# Patient Record
Sex: MALE | Race: WHITE | NOT HISPANIC OR LATINO | Employment: OTHER | URBAN - METROPOLITAN AREA
[De-identification: names, ages, dates, MRNs, and addresses within clinical notes are randomized per-mention and may not be internally consistent; named-entity substitution may affect disease eponyms.]

---

## 2018-07-23 ENCOUNTER — HISTORICAL (OUTPATIENT)
Dept: ADMINISTRATIVE | Facility: HOSPITAL | Age: 43
End: 2018-07-23

## 2018-07-25 LAB — FINAL CULTURE: NORMAL

## 2018-08-02 ENCOUNTER — HISTORICAL (OUTPATIENT)
Dept: ADMINISTRATIVE | Facility: HOSPITAL | Age: 43
End: 2018-08-02

## 2018-08-02 LAB
BUN SERPL-MCNC: 19 MG/DL (ref 7–18)
CREAT SERPL-MCNC: 1.28 MG/DL (ref 0.7–1.3)

## 2020-06-26 ENCOUNTER — HISTORICAL (OUTPATIENT)
Dept: ADMINISTRATIVE | Facility: HOSPITAL | Age: 45
End: 2020-06-26

## 2020-06-28 LAB — FINAL CULTURE: NORMAL

## 2020-12-28 ENCOUNTER — HISTORICAL (OUTPATIENT)
Dept: ADMINISTRATIVE | Facility: HOSPITAL | Age: 45
End: 2020-12-28

## 2020-12-30 LAB — FINAL CULTURE: NORMAL

## 2022-05-11 RX ORDER — TESTOSTERONE CYPIONATE 200 MG/ML
1 INJECTION, SOLUTION INTRAMUSCULAR
COMMUNITY
Start: 2022-01-05 | End: 2022-05-23 | Stop reason: SDUPTHER

## 2022-05-11 RX ORDER — TESTOSTERONE CYPIONATE 200 MG/ML
200 INJECTION, SOLUTION INTRAMUSCULAR
Qty: 6 ML | Refills: 3 | Status: CANCELLED | OUTPATIENT
Start: 2022-05-11 | End: 2023-05-11

## 2022-05-11 NOTE — TELEPHONE ENCOUNTER
----- Message from Deidre Tan sent at 5/11/2022  1:20 PM CDT -----  Regarding: testosterone  Please call patient at [phone] regarding his testosterone

## 2022-05-23 ENCOUNTER — TELEPHONE (OUTPATIENT)
Dept: INTERNAL MEDICINE | Facility: CLINIC | Age: 47
End: 2022-05-23

## 2022-05-23 DIAGNOSIS — E29.1 HYPOGONADISM IN MALE: Primary | ICD-10-CM

## 2022-05-23 RX ORDER — TESTOSTERONE CYPIONATE 200 MG/ML
200 INJECTION, SOLUTION INTRAMUSCULAR
Qty: 6 ML | Refills: 1 | Status: SHIPPED | OUTPATIENT
Start: 2022-05-23 | End: 2022-12-05

## 2022-05-23 NOTE — TELEPHONE ENCOUNTER
----- Message from Jazmyne Sutton sent at 5/23/2022  9:33 AM CDT -----  Regarding: refilled?  Pt states that he was suppose to get a script called in for testostrone at [facility]  [phone]      Pharmacy told patient that nothing was called

## 2022-08-10 DIAGNOSIS — R33.9 URINARY RETENTION: Primary | ICD-10-CM

## 2022-08-10 RX ORDER — TAMSULOSIN HYDROCHLORIDE 0.4 MG/1
1 CAPSULE ORAL DAILY
COMMUNITY
End: 2022-08-10 | Stop reason: SDUPTHER

## 2022-08-10 RX ORDER — TAMSULOSIN HYDROCHLORIDE 0.4 MG/1
1 CAPSULE ORAL DAILY
Qty: 90 CAPSULE | Refills: 1 | Status: SHIPPED | OUTPATIENT
Start: 2022-08-10 | End: 2022-10-03 | Stop reason: SDUPTHER

## 2022-10-03 DIAGNOSIS — R33.9 URINARY RETENTION: ICD-10-CM

## 2022-10-03 RX ORDER — TAMSULOSIN HYDROCHLORIDE 0.4 MG/1
1 CAPSULE ORAL 2 TIMES DAILY
Qty: 90 CAPSULE | Refills: 1 | Status: SHIPPED | OUTPATIENT
Start: 2022-10-03 | End: 2023-01-03 | Stop reason: SDUPTHER

## 2022-10-03 RX ORDER — TAMSULOSIN HYDROCHLORIDE 0.4 MG/1
1 CAPSULE ORAL DAILY
Qty: 90 CAPSULE | Refills: 1 | Status: SHIPPED | OUTPATIENT
Start: 2022-10-03 | End: 2022-10-03 | Stop reason: SDUPTHER

## 2022-12-03 DIAGNOSIS — E29.1 HYPOGONADISM IN MALE: Primary | ICD-10-CM

## 2022-12-05 RX ORDER — TESTOSTERONE CYPIONATE 200 MG/ML
INJECTION, SOLUTION INTRAMUSCULAR
Qty: 10 ML | Refills: 1 | Status: SHIPPED | OUTPATIENT
Start: 2022-12-05 | End: 2023-09-13

## 2023-01-03 DIAGNOSIS — R33.9 URINARY RETENTION: ICD-10-CM

## 2023-01-03 RX ORDER — TAMSULOSIN HYDROCHLORIDE 0.4 MG/1
0.4 CAPSULE ORAL 2 TIMES DAILY
Qty: 180 CAPSULE | Refills: 2 | Status: SHIPPED | OUTPATIENT
Start: 2023-01-03 | End: 2023-10-18 | Stop reason: SDUPTHER

## 2023-03-28 DIAGNOSIS — Z00.00 WELLNESS EXAMINATION: ICD-10-CM

## 2023-03-28 DIAGNOSIS — Z12.5 SCREENING FOR PROSTATE CANCER: ICD-10-CM

## 2023-03-28 DIAGNOSIS — I10 HYPERTENSION, UNSPECIFIED TYPE: ICD-10-CM

## 2023-03-28 DIAGNOSIS — E29.1 HYPOGONADISM IN MALE: ICD-10-CM

## 2023-03-28 DIAGNOSIS — R33.9 URINARY RETENTION: Primary | ICD-10-CM

## 2023-03-28 DIAGNOSIS — E78.5 HYPERLIPIDEMIA, UNSPECIFIED HYPERLIPIDEMIA TYPE: ICD-10-CM

## 2023-03-30 ENCOUNTER — TELEPHONE (OUTPATIENT)
Dept: INTERNAL MEDICINE | Facility: CLINIC | Age: 48
End: 2023-03-30
Payer: COMMERCIAL

## 2023-03-30 LAB
CHOLEST SERPL-MSCNC: 173 MG/DL (ref 0–200)
HDLC SERPL-MCNC: 39 MG/DL (ref 35–70)
LDLC SERPL CALC-MCNC: 113 MG/DL (ref 0–160)
TRIGL SERPL-MCNC: 106 MG/DL (ref 40–160)

## 2023-04-03 PROBLEM — R33.9 URINARY RETENTION: Status: ACTIVE | Noted: 2023-04-03

## 2023-04-04 ENCOUNTER — OFFICE VISIT (OUTPATIENT)
Dept: INTERNAL MEDICINE | Facility: CLINIC | Age: 48
End: 2023-04-04
Payer: COMMERCIAL

## 2023-04-04 VITALS
HEART RATE: 68 BPM | SYSTOLIC BLOOD PRESSURE: 126 MMHG | OXYGEN SATURATION: 99 % | WEIGHT: 265.81 LBS | DIASTOLIC BLOOD PRESSURE: 74 MMHG | BODY MASS INDEX: 36 KG/M2 | HEIGHT: 72 IN

## 2023-04-04 DIAGNOSIS — Z00.00 WELLNESS EXAMINATION: Primary | ICD-10-CM

## 2023-04-04 DIAGNOSIS — N40.0 PROSTATE HYPERTROPHY: ICD-10-CM

## 2023-04-04 DIAGNOSIS — Z12.5 SCREENING FOR PROSTATE CANCER: ICD-10-CM

## 2023-04-04 DIAGNOSIS — Z12.11 SCREENING FOR MALIGNANT NEOPLASM OF COLON: ICD-10-CM

## 2023-04-04 DIAGNOSIS — E78.5 HYPERLIPIDEMIA, UNSPECIFIED HYPERLIPIDEMIA TYPE: ICD-10-CM

## 2023-04-04 DIAGNOSIS — R33.9 URINARY RETENTION: ICD-10-CM

## 2023-04-04 PROCEDURE — 3078F DIAST BP <80 MM HG: CPT | Mod: CPTII,,, | Performed by: INTERNAL MEDICINE

## 2023-04-04 PROCEDURE — 3008F BODY MASS INDEX DOCD: CPT | Mod: CPTII,,, | Performed by: INTERNAL MEDICINE

## 2023-04-04 PROCEDURE — 3078F PR MOST RECENT DIASTOLIC BLOOD PRESSURE < 80 MM HG: ICD-10-PCS | Mod: CPTII,,, | Performed by: INTERNAL MEDICINE

## 2023-04-04 PROCEDURE — 4010F ACE/ARB THERAPY RXD/TAKEN: CPT | Mod: CPTII,,, | Performed by: INTERNAL MEDICINE

## 2023-04-04 PROCEDURE — 3008F PR BODY MASS INDEX (BMI) DOCUMENTED: ICD-10-PCS | Mod: CPTII,,, | Performed by: INTERNAL MEDICINE

## 2023-04-04 PROCEDURE — 4010F PR ACE/ARB THEARPY RXD/TAKEN: ICD-10-PCS | Mod: CPTII,,, | Performed by: INTERNAL MEDICINE

## 2023-04-04 PROCEDURE — 1160F PR REVIEW ALL MEDS BY PRESCRIBER/CLIN PHARMACIST DOCUMENTED: ICD-10-PCS | Mod: CPTII,,, | Performed by: INTERNAL MEDICINE

## 2023-04-04 PROCEDURE — 99396 PREV VISIT EST AGE 40-64: CPT | Mod: ,,, | Performed by: INTERNAL MEDICINE

## 2023-04-04 PROCEDURE — 1159F PR MEDICATION LIST DOCUMENTED IN MEDICAL RECORD: ICD-10-PCS | Mod: CPTII,,, | Performed by: INTERNAL MEDICINE

## 2023-04-04 PROCEDURE — 99396 PR PREVENTIVE VISIT,EST,40-64: ICD-10-PCS | Mod: ,,, | Performed by: INTERNAL MEDICINE

## 2023-04-04 PROCEDURE — 1160F RVW MEDS BY RX/DR IN RCRD: CPT | Mod: CPTII,,, | Performed by: INTERNAL MEDICINE

## 2023-04-04 PROCEDURE — 3074F SYST BP LT 130 MM HG: CPT | Mod: CPTII,,, | Performed by: INTERNAL MEDICINE

## 2023-04-04 PROCEDURE — 1159F MED LIST DOCD IN RCRD: CPT | Mod: CPTII,,, | Performed by: INTERNAL MEDICINE

## 2023-04-04 PROCEDURE — 3074F PR MOST RECENT SYSTOLIC BLOOD PRESSURE < 130 MM HG: ICD-10-PCS | Mod: CPTII,,, | Performed by: INTERNAL MEDICINE

## 2023-04-04 RX ORDER — NEEDLES, DISPOSABLE 25GX5/8"
NEEDLE, DISPOSABLE MISCELLANEOUS
COMMUNITY
Start: 2022-12-05 | End: 2024-02-08

## 2023-04-04 RX ORDER — LISINOPRIL AND HYDROCHLOROTHIAZIDE 10; 12.5 MG/1; MG/1
1 TABLET ORAL
COMMUNITY
Start: 2023-01-03 | End: 2023-04-06 | Stop reason: SDUPTHER

## 2023-04-04 RX ORDER — SERTRALINE HYDROCHLORIDE 100 MG/1
100 TABLET, FILM COATED ORAL
COMMUNITY
Start: 2023-03-18

## 2023-04-04 RX ORDER — SYRINGE WITH NEEDLE, 1 ML 25GX5/8"
SYRINGE, EMPTY DISPOSABLE MISCELLANEOUS
COMMUNITY
Start: 2022-10-10

## 2023-04-04 RX ORDER — ALLOPURINOL 100 MG/1
100 TABLET ORAL
COMMUNITY
Start: 2023-01-13 | End: 2023-04-13 | Stop reason: SDUPTHER

## 2023-04-04 NOTE — PROGRESS NOTES
Levi Chaney MD        PATIENT NAME: Yevgeniy Yin  : 1975  DATE: 23  MRN: [id number]      Billing Provider: Levi Chaney MD  Level of Service:   Patient PCP Information       Provider PCP Type    Levi Chaney MD General            Reason for Visit / Chief Complaint: Annual Exam (Wellness/)       Update PCP  Update Chief Complaint         History of Present Illness / Problem Focused Workflow     Yevgeniy Yin presents to the clinic with Annual Exam (Wellness/)     Yevgeniy is here for his annual wellness exam   He is doing well with no problems   Testosterone injections going well.  He does have an issue that the Flomax is working as well as it should and having trouble with his urine      Review of Systems   Review of Systems   Constitutional: Negative.    HENT: Negative.     Eyes: Negative.    Respiratory: Negative.     Cardiovascular: Negative.    Gastrointestinal: Negative.    Endocrine: Negative.    Genitourinary: Negative.    Musculoskeletal: Negative.    Integumentary:  Negative.   Neurological: Negative.    Psychiatric/Behavioral: Negative.        Medical / Social / Family History     Past Medical History:   Diagnosis Date    Gout, unspecified     High blood pressure     Hypogonadism in male     Sleep apnea, unspecified        Past Surgical History:   Procedure Laterality Date    APPENDECTOMY      FACIAL RECONSTRUCTION SURGERY N/A     KNEE SURGERY N/A     x2       Social History  Mr. Yin  reports that he has never smoked. He has never been exposed to tobacco smoke. He has never used smokeless tobacco. He reports current alcohol use of about 1.0 standard drink per week.    Family History  Mr.'s Yin  family history is not on file.    Medications and Allergies     Medications  Outpatient Medications Marked as Taking for the 23 encounter (Office Visit) with Levi Chaney MD   Medication Sig Dispense Refill    allopurinoL (ZYLOPRIM) 100 MG tablet Take 100 mg  "by mouth.      BD LUER-TENISHA SYRINGE 3 mL 22 x 1 1/2" Syrg SMARTSIG:Injection As Directed      BD REGULAR BEVEL NEEDLES 18 gauge x 1" Ndle SMARTSIG:Injection As Directed      lisinopriL-hydrochlorothiazide (PRINZIDE,ZESTORETIC) 10-12.5 mg per tablet Take 1 tablet by mouth.      sertraline (ZOLOFT) 100 MG tablet Take 100 mg by mouth.      tamsulosin (FLOMAX) 0.4 mg Cap Take 1 capsule (0.4 mg total) by mouth 2 (two) times a day. 180 capsule 2    testosterone cypionate (DEPOTESTOTERONE CYPIONATE) 200 mg/mL injection INJECT ONE MILLILITER intramuscularly EVERY 2 WEEKS 10 mL 1       Allergies  Review of patient's allergies indicates:  No Known Allergies    Physical Examination     Vitals:    04/04/23 0841   BP: 126/74   Pulse: 68     Physical Exam  Vitals reviewed.   Constitutional:       Appearance: Normal appearance.   HENT:      Head: Normocephalic.      Right Ear: Tympanic membrane normal.      Left Ear: Tympanic membrane normal.      Nose: Nose normal.      Mouth/Throat:      Pharynx: Oropharynx is clear.   Eyes:      Extraocular Movements: Extraocular movements intact.      Pupils: Pupils are equal, round, and reactive to light.   Cardiovascular:      Rate and Rhythm: Normal rate and regular rhythm.      Pulses: Normal pulses.      Heart sounds: Normal heart sounds.   Pulmonary:      Effort: Pulmonary effort is normal.      Breath sounds: Normal breath sounds.   Abdominal:      General: Abdomen is flat. Bowel sounds are normal.      Palpations: Abdomen is soft.   Genitourinary:     Testes: Normal.      Prostate: Normal.      Rectum: Normal.   Musculoskeletal:         General: Normal range of motion.      Cervical back: Normal range of motion.   Skin:     General: Skin is warm and dry.   Neurological:      General: No focal deficit present.      Mental Status: He is alert and oriented to person, place, and time.   Psychiatric:         Mood and Affect: Mood normal.         Behavior: Behavior normal.        Assessment " and Plan (including Health Maintenance)      Problem List  Smart Sets  Document Outside HM   :    Plan:   Wellness examination    Hyperlipidemia, unspecified hyperlipidemia type    Screening for prostate cancer    Prostate hypertrophy    Screening for malignant neoplasm of colon    Urinary retention     Review of all labs stable   Referral to urology for further evaluation   Referral for colonoscopy   Revisit 1 year annual wellness  He will continue this testosterone injections every 2 weeks.           Health Maintenance Due   Topic Date Due    Hepatitis C Screening  Never done    HIV Screening  Never done    Hemoglobin A1c (Diabetic Prevention Screening)  Never done    Colorectal Cancer Screening  Never done    COVID-19 Vaccine (3 - Booster for Pfizer series) 06/07/2021    Influenza Vaccine (1) Never done       Problem List Items Addressed This Visit    None  Visit Diagnoses       Wellness examination    -  Primary    Hyperlipidemia, unspecified hyperlipidemia type        Screening for prostate cancer        Prostate hypertrophy        Screening for malignant neoplasm of colon        Urinary retention                Health Maintenance Topics with due status: Not Due       Topic Last Completion Date    TETANUS VACCINE 05/23/2022    Lipid Panel 03/30/2023       No future appointments.         Signature:  Levi Arreola MD  [facility]  [address]  [place] [id number]    Date of encounter: 4/4/23

## 2023-04-06 DIAGNOSIS — I10 HYPERTENSION, UNSPECIFIED TYPE: Primary | Chronic | ICD-10-CM

## 2023-04-06 RX ORDER — LISINOPRIL AND HYDROCHLOROTHIAZIDE 10; 12.5 MG/1; MG/1
1 TABLET ORAL DAILY
Qty: 30 TABLET | Refills: 11 | Status: SHIPPED | OUTPATIENT
Start: 2023-04-06

## 2023-04-13 DIAGNOSIS — M10.9 GOUT, UNSPECIFIED CAUSE, UNSPECIFIED CHRONICITY, UNSPECIFIED SITE: Primary | Chronic | ICD-10-CM

## 2023-04-13 RX ORDER — ALLOPURINOL 100 MG/1
100 TABLET ORAL DAILY
Qty: 30 TABLET | Refills: 3 | Status: SHIPPED | OUTPATIENT
Start: 2023-04-13 | End: 2023-08-22 | Stop reason: SDUPTHER

## 2023-06-14 ENCOUNTER — TELEPHONE (OUTPATIENT)
Dept: INTERNAL MEDICINE | Facility: CLINIC | Age: 48
End: 2023-06-14
Payer: COMMERCIAL

## 2023-06-14 NOTE — TELEPHONE ENCOUNTER
----- Message from Jaimie Castellano sent at 6/14/2023  9:34 AM CDT -----  .Type:  Needs Medical Advice    Who Called: pt  Symptoms (please be specific):    How long has patient had these symptoms:    Pharmacy name and phone #:    Would the patient rather a call back or a response via [facility]?   Best Call Back Number: [phone]  Additional Information: please resend urology referral they told pt they didn't receive it please fax to [phone]

## 2023-06-14 NOTE — TELEPHONE ENCOUNTER
Spoke with Isabel at [facility]. They received referral and they are working on it. They will be calling him. Patient was notified

## 2023-07-06 LAB — CRC RECOMMENDATION EXT: NORMAL

## 2023-07-13 ENCOUNTER — PATIENT OUTREACH (OUTPATIENT)
Dept: ADMINISTRATIVE | Facility: HOSPITAL | Age: 48
End: 2023-07-13
Payer: COMMERCIAL

## 2023-08-22 ENCOUNTER — TELEPHONE (OUTPATIENT)
Dept: INTERNAL MEDICINE | Facility: CLINIC | Age: 48
End: 2023-08-22
Payer: COMMERCIAL

## 2023-08-22 DIAGNOSIS — M10.9 GOUT, UNSPECIFIED CAUSE, UNSPECIFIED CHRONICITY, UNSPECIFIED SITE: Primary | Chronic | ICD-10-CM

## 2023-08-22 RX ORDER — ALLOPURINOL 100 MG/1
100 TABLET ORAL DAILY
Qty: 30 TABLET | Refills: 3 | Status: SHIPPED | OUTPATIENT
Start: 2023-08-22 | End: 2024-01-22 | Stop reason: SDUPTHER

## 2023-08-22 NOTE — TELEPHONE ENCOUNTER
----- Message from Nae Womack sent at 8/22/2023  3:28 PM CDT -----  .Type:  RX Refill Request    Who Called: pt  Refill or New Rx:refill  RX Name and Strength:allopurinoL (ZYLOPRIM) 100 MG tablet  How is the patient currently taking it? 1XDay  Is this a 30 day or 90 day RX:30  Preferred Pharmacy with phone number:[address]  Local or Mail Order:local  Ordering Provider:Elba  Would the patient rather a call back or a response via [facility]? Call back   Best Call Back Number:[phone]  Additional Information: requesting refill

## 2023-09-12 DIAGNOSIS — E29.1 HYPOGONADISM IN MALE: ICD-10-CM

## 2023-09-13 RX ORDER — TESTOSTERONE CYPIONATE 200 MG/ML
INJECTION, SOLUTION INTRAMUSCULAR
Qty: 10 ML | Refills: 1 | Status: SHIPPED | OUTPATIENT
Start: 2023-09-13

## 2023-10-18 DIAGNOSIS — R33.9 URINARY RETENTION: ICD-10-CM

## 2023-10-18 RX ORDER — TAMSULOSIN HYDROCHLORIDE 0.4 MG/1
0.4 CAPSULE ORAL 2 TIMES DAILY
Qty: 180 CAPSULE | Refills: 3 | Status: SHIPPED | OUTPATIENT
Start: 2023-10-18

## 2024-01-22 DIAGNOSIS — M10.9 GOUT, UNSPECIFIED CAUSE, UNSPECIFIED CHRONICITY, UNSPECIFIED SITE: Chronic | ICD-10-CM

## 2024-01-22 RX ORDER — ALLOPURINOL 100 MG/1
100 TABLET ORAL DAILY
Qty: 30 TABLET | Refills: 3 | Status: SHIPPED | OUTPATIENT
Start: 2024-01-22 | End: 2024-05-30 | Stop reason: SDUPTHER

## 2024-02-08 RX ORDER — NEEDLES, DISPOSABLE 25GX5/8"
NEEDLE, DISPOSABLE MISCELLANEOUS
Qty: 10 EACH | Refills: 2 | Status: SHIPPED | OUTPATIENT
Start: 2024-02-08

## 2024-02-08 RX ORDER — SYRINGE WITH NEEDLE, 1 ML 25GX5/8"
SYRINGE, EMPTY DISPOSABLE MISCELLANEOUS
Qty: 10 EACH | Refills: 2 | Status: SHIPPED | OUTPATIENT
Start: 2024-02-08

## 2024-05-30 DIAGNOSIS — M10.9 GOUT, UNSPECIFIED CAUSE, UNSPECIFIED CHRONICITY, UNSPECIFIED SITE: Chronic | ICD-10-CM

## 2024-05-30 RX ORDER — ALLOPURINOL 100 MG/1
100 TABLET ORAL DAILY
Qty: 30 TABLET | Refills: 3 | Status: SHIPPED | OUTPATIENT
Start: 2024-05-30

## 2024-06-16 DIAGNOSIS — E29.1 HYPOGONADISM IN MALE: ICD-10-CM

## 2024-06-17 RX ORDER — TESTOSTERONE CYPIONATE 200 MG/ML
INJECTION, SOLUTION INTRAMUSCULAR
Qty: 10 ML | Refills: 1 | Status: SHIPPED | OUTPATIENT
Start: 2024-06-17

## 2024-06-24 ENCOUNTER — TELEPHONE (OUTPATIENT)
Dept: INTERNAL MEDICINE | Facility: CLINIC | Age: 49
End: 2024-06-24
Payer: COMMERCIAL

## 2024-06-24 NOTE — TELEPHONE ENCOUNTER
----- Message from Ector Yin sent at 6/24/2024  3:35 PM CDT -----  .Type:  Needs Medical Advice    Who Called: Yevgeniy  Symptoms (please be specific):    How long has patient had these symptoms:    Pharmacy name and phone #:    Would the patient rather a call back or a response via [facility]?   Best Call Back Number: [phone]  Additional Information: Made an apt reschedule wellness for next Monday the 1st. He requested lab orders be mailed to him at home address so he can go do blood work at [facility] on [address].

## 2024-06-27 PROBLEM — Z00.00 WELLNESS EXAMINATION: Status: ACTIVE | Noted: 2024-06-27

## 2024-06-28 ENCOUNTER — PATIENT OUTREACH (OUTPATIENT)
Facility: CLINIC | Age: 49
End: 2024-06-28
Payer: COMMERCIAL

## 2024-06-28 LAB
CHOLEST SERPL-MSCNC: 187 MG/DL (ref 0–200)
HDLC SERPL-MCNC: 45 MG/DL (ref 35–70)
LDLC SERPL CALC-MCNC: 124 MG/DL (ref 0–160)
TRIGL SERPL-MCNC: 93 MG/DL (ref 40–160)

## 2024-06-28 NOTE — PROGRESS NOTES
Health Maintenance Topic(s) Outreach Outcomes & Actions Taken:    Lab(s) - Outreach Outcomes & Actions Taken  : External Records Uploaded & Care Team Updated if Applicable     Additional Notes:  Lipid 6/28/24

## 2024-07-01 ENCOUNTER — OFFICE VISIT (OUTPATIENT)
Dept: INTERNAL MEDICINE | Facility: CLINIC | Age: 49
End: 2024-07-01
Payer: COMMERCIAL

## 2024-07-01 ENCOUNTER — TELEPHONE (OUTPATIENT)
Dept: INTERNAL MEDICINE | Facility: CLINIC | Age: 49
End: 2024-07-01

## 2024-07-01 VITALS
BODY MASS INDEX: 34.4 KG/M2 | DIASTOLIC BLOOD PRESSURE: 80 MMHG | OXYGEN SATURATION: 95 % | HEIGHT: 72 IN | HEART RATE: 70 BPM | RESPIRATION RATE: 18 BRPM | WEIGHT: 254 LBS | SYSTOLIC BLOOD PRESSURE: 124 MMHG

## 2024-07-01 DIAGNOSIS — E29.1 HYPOGONADISM IN MALE: Chronic | ICD-10-CM

## 2024-07-01 DIAGNOSIS — Z00.00 WELLNESS EXAMINATION: Primary | ICD-10-CM

## 2024-07-01 DIAGNOSIS — M10.00 IDIOPATHIC GOUT, UNSPECIFIED CHRONICITY, UNSPECIFIED SITE: Chronic | ICD-10-CM

## 2024-07-01 DIAGNOSIS — I10 PRIMARY HYPERTENSION: Chronic | ICD-10-CM

## 2024-07-01 PROCEDURE — 3074F SYST BP LT 130 MM HG: CPT | Mod: CPTII,,, | Performed by: INTERNAL MEDICINE

## 2024-07-01 PROCEDURE — 1160F RVW MEDS BY RX/DR IN RCRD: CPT | Mod: CPTII,,, | Performed by: INTERNAL MEDICINE

## 2024-07-01 PROCEDURE — 3008F BODY MASS INDEX DOCD: CPT | Mod: CPTII,,, | Performed by: INTERNAL MEDICINE

## 2024-07-01 PROCEDURE — 3079F DIAST BP 80-89 MM HG: CPT | Mod: CPTII,,, | Performed by: INTERNAL MEDICINE

## 2024-07-01 PROCEDURE — 99396 PREV VISIT EST AGE 40-64: CPT | Mod: ,,, | Performed by: INTERNAL MEDICINE

## 2024-07-01 PROCEDURE — 1159F MED LIST DOCD IN RCRD: CPT | Mod: CPTII,,, | Performed by: INTERNAL MEDICINE

## 2024-07-01 RX ORDER — FINASTERIDE 5 MG/1
1 TABLET, FILM COATED ORAL DAILY
COMMUNITY

## 2024-07-01 RX ORDER — TESTOSTERONE CYPIONATE 200 MG/ML
300 INJECTION, SOLUTION INTRAMUSCULAR
Qty: 10 ML | Refills: 5 | Status: SHIPPED | OUTPATIENT
Start: 2024-07-01 | End: 2026-01-12

## 2024-07-01 NOTE — TELEPHONE ENCOUNTER
----- Message from Deidre Tan sent at 7/1/2024  9:39 AM CDT -----  Regarding: testosterone  Patient wants to make sure you changed testosterone to 1.5

## 2024-07-01 NOTE — PROGRESS NOTES
Levi Chaney MD        PATIENT NAME: Yevgeniy Yin  : 1975  DATE: 24  MRN: [id number]      Billing Provider: Levi Chaney MD  Level of Service: [place] PREVENTIVE VISIT,EST,40-64  Patient PCP Information       Provider PCP Type    Levi Chaney MD General            Reason for Visit / Chief Complaint: Annual Exam (Discuss labs /)       Update PCP  Update Chief Complaint         History of Present Illness / Problem Focused Workflow     Yevgeniy Yin presents to the clinic with Annual Exam (Discuss labs /)     Yevgeniy is here for his annual wellness exam   His only issue swelling of his right foot which is cardiologist's is evaluating.        Review of Systems   Review of Systems   Constitutional: Negative.    HENT: Negative.     Eyes: Negative.    Respiratory: Negative.     Cardiovascular: Negative.    Gastrointestinal: Negative.    Endocrine: Negative.    Genitourinary: Negative.    Musculoskeletal: Negative.    Integumentary:  Negative.   Neurological: Negative.    Psychiatric/Behavioral: Negative.          Medical / Social / Family History     Past Medical History:   Diagnosis Date    Gout, unspecified     Hypogonadism in male     Sleep apnea, unspecified        Past Surgical History:   Procedure Laterality Date    APPENDECTOMY      Colon AEC   2023    Trever Alexis/Repeat colonoscopy in 10 years    FACIAL RECONSTRUCTION SURGERY N/A     KNEE SURGERY N/A     x2       Social History  Mr. Yin  reports that he has never smoked. He has never been exposed to tobacco smoke. He has never used smokeless tobacco. He reports current alcohol use of about 1.0 standard drink of alcohol per week.    Family History  Mr.'s Yin  family history is not on file.    Medications and Allergies     Medications  Outpatient Medications Marked as Taking for the 24 encounter (Office Visit) with Levi Chaney MD   Medication Sig Dispense Refill    allopurinoL (ZYLOPRIM) 100 MG  "tablet Take 1 tablet (100 mg total) by mouth once daily. 30 tablet 3    BD LUER-TENISHA SYRINGE 3 mL 22 gauge x 1" Syrg USE AS DIRECTED TO INJECT TESTOSTERONE. 10 each 2    BD LUER-TENISHA SYRINGE 3 mL 22 x 1 1/2" Syrg SMARTSIG:Injection As Directed      BD REGULAR BEVEL NEEDLES 18 gauge x 1" Ndle USE AS DIRECTED TO INJECT TESTOSTERONE. 10 each 2    finasteride (PROSCAR) 5 mg tablet Take 1 tablet by mouth once daily.      sertraline (ZOLOFT) 100 MG tablet Take 100 mg by mouth.      tamsulosin (FLOMAX) 0.4 mg Cap Take 1 capsule (0.4 mg total) by mouth 2 (two) times a day. 180 capsule 3    testosterone cypionate (DEPOTESTOTERONE CYPIONATE) 200 mg/mL injection INJECT 1 ML INTRAMUSCULARLY ONCE EVERY 2 WEEKS 10 mL 1       Allergies  Review of patient's allergies indicates:   Allergen Reactions    Tramadol Hallucinations       Physical Examination     Vitals:    07/01/24 0918   BP: 124/80   Pulse: 70   Resp: 18     Physical Exam  Vitals reviewed.   Constitutional:       Appearance: Normal appearance.   HENT:      Head: Normocephalic.      Right Ear: Tympanic membrane normal.      Left Ear: Tympanic membrane normal.      Nose: Nose normal.      Mouth/Throat:      Pharynx: Oropharynx is clear.   Eyes:      Extraocular Movements: Extraocular movements intact.      Pupils: Pupils are equal, round, and reactive to light.   Cardiovascular:      Rate and Rhythm: Normal rate and regular rhythm.      Pulses: Normal pulses.      Heart sounds: Normal heart sounds.   Pulmonary:      Effort: Pulmonary effort is normal.      Breath sounds: Normal breath sounds.   Abdominal:      General: Abdomen is flat. Bowel sounds are normal.      Palpations: Abdomen is soft.   Genitourinary:     Testes: Normal.      Prostate: Normal.      Rectum: Normal.   Musculoskeletal:         General: Normal range of motion.      Cervical back: Normal range of motion.   Skin:     General: Skin is warm and dry.   Neurological:      General: No focal deficit present.    "   Mental Status: He is alert and oriented to person, place, and time.   Psychiatric:         Mood and Affect: Mood normal.         Behavior: Behavior normal.          Assessment and Plan (including Health Maintenance)      Problem List  Smart Sets  Document Outside HM   :    Plan:    ICD-10-CM ICD-9-CM   1. Wellness examination  Z00.00 V70.0   2. Idiopathic gout, unspecified chronicity, unspecified site  M10.00 274.9   3. Primary hypertension  I10 401.9   4. Hypogonadism in male  E29.1 257.2       Problem List Items Addressed This Visit          Endocrine    Hypogonadism in male (Chronic)     Testosterone and PSA level were not done by lab were checking into this   He says he is very symptomatic after about a week and low we would like to go back to the 1-1/2 cc every 2 weeks I have prescribed this for him  His H&H is slightly elevated I have encouraged him to donate a unit of blood every 3 months.            Orthopedic    Gout, unspecified (Chronic)     No gout attacks continue medication            Other    Wellness examination - Primary     Discussed results of examination laboratory.  He will return in six-month          Other Visit Diagnoses       Primary hypertension  (Chronic)                      Health Maintenance Due   Topic Date Due    Hepatitis C Screening  Never done    HIV Screening  Never done    Hemoglobin A1c (Diabetic Prevention Screening)  Never done    Shingles Vaccine (1 of 2) Never done    COVID-19 Vaccine (3 - 2023-24 season) 09/01/2023       Problem List Items Addressed This Visit          Endocrine    Hypogonadism in male (Chronic)    Current Assessment & Plan     Testosterone and PSA level were not done by lab were checking into this   He says he is very symptomatic after about a week and low we would like to go back to the 1-1/2 cc every 2 weeks I have prescribed this for him  His H&H is slightly elevated I have encouraged him to donate a unit of blood every 3 months.            Orthopedic     Gout, unspecified (Chronic)    Current Assessment & Plan     No gout attacks continue medication            Other    Wellness examination - Primary    Current Assessment & Plan     Discussed results of examination laboratory.  He will return in six-month          Other Visit Diagnoses       Primary hypertension  (Chronic)               Health Maintenance Topics with due status: Not Due       Topic Last Completion Date    TETANUS VACCINE 05/23/2022    Colorectal Cancer Screening 07/06/2023    Lipid Panel 06/28/2024    Influenza Vaccine Not Due       Future Appointments   Date Time Provider Department Center   1/21/2025  9:40 AM Levi Chaney MD [place] [address]            Signature:  Levi Chaney MD  [facility]  [facility] [facility] [address]    Date of encounter: 7/1/24

## 2024-07-01 NOTE — ASSESSMENT & PLAN NOTE
Testosterone and PSA level were not done by lab were checking into this   He says he is very symptomatic after about a week and low we would like to go back to the 1-1/2 cc every 2 weeks I have prescribed this for him  His H&H is slightly elevated I have encouraged him to donate a unit of blood every 3 months.

## 2024-07-01 NOTE — TELEPHONE ENCOUNTER
Yes new prescription is for 1.5 cc   Told to make sure that the pharmacist gets this correct when he picks up the medicine this when he picks up the medicine

## 2024-09-30 PROBLEM — Z00.00 WELLNESS EXAMINATION: Status: RESOLVED | Noted: 2024-06-27 | Resolved: 2024-09-30

## 2024-10-01 DIAGNOSIS — M10.9 GOUT, UNSPECIFIED CAUSE, UNSPECIFIED CHRONICITY, UNSPECIFIED SITE: Chronic | ICD-10-CM

## 2024-10-01 RX ORDER — ALLOPURINOL 100 MG/1
100 TABLET ORAL DAILY
Qty: 30 TABLET | Refills: 3 | Status: SHIPPED | OUTPATIENT
Start: 2024-10-01

## 2024-11-18 ENCOUNTER — TELEPHONE (OUTPATIENT)
Dept: INTERNAL MEDICINE | Facility: CLINIC | Age: 49
End: 2024-11-18
Payer: COMMERCIAL

## 2024-11-18 DIAGNOSIS — R33.9 URINARY RETENTION: ICD-10-CM

## 2024-11-18 RX ORDER — TAMSULOSIN HYDROCHLORIDE 0.4 MG/1
0.4 CAPSULE ORAL 2 TIMES DAILY
Qty: 180 CAPSULE | Refills: 3 | Status: SHIPPED | OUTPATIENT
Start: 2024-11-18

## 2024-11-18 NOTE — TELEPHONE ENCOUNTER
----- Message from Ector sent at 11/18/2024  7:47 AM CST -----  .Type:  Needs Medical Advice    Who Called: Yevgeniy   Symptoms (please be specific):    How long has patient had these symptoms:    Pharmacy name and phone #:    Would the patient rather a call back or a response via [facility]?   Best Call Back Number: [phone]  Additional Information: Patient requested to have the nurse call him back he needed to get a referral for a ankle sprain or possible break, please call him back when available.

## 2024-11-18 NOTE — TELEPHONE ENCOUNTER
Pt called for a referral to ortho but states that she found some paper work form a provider that dr houser referred him to before, he will call them for an appointment.

## 2025-01-07 ENCOUNTER — TELEPHONE (OUTPATIENT)
Dept: INTERNAL MEDICINE | Facility: CLINIC | Age: 50
End: 2025-01-07
Payer: COMMERCIAL

## 2025-01-14 ENCOUNTER — TELEPHONE (OUTPATIENT)
Dept: INTERNAL MEDICINE | Facility: CLINIC | Age: 50
End: 2025-01-14
Payer: COMMERCIAL

## 2025-01-14 DIAGNOSIS — Z13.228 SCREENING FOR ENDOCRINE, METABOLIC AND IMMUNITY DISORDER: ICD-10-CM

## 2025-01-14 DIAGNOSIS — R33.9 URINARY RETENTION: ICD-10-CM

## 2025-01-14 DIAGNOSIS — I10 PRIMARY HYPERTENSION: ICD-10-CM

## 2025-01-14 DIAGNOSIS — Z13.29 SCREENING FOR ENDOCRINE, METABOLIC AND IMMUNITY DISORDER: ICD-10-CM

## 2025-01-14 DIAGNOSIS — M10.9 GOUT, UNSPECIFIED CAUSE, UNSPECIFIED CHRONICITY, UNSPECIFIED SITE: Primary | Chronic | ICD-10-CM

## 2025-01-14 DIAGNOSIS — Z13.0 SCREENING FOR ENDOCRINE, METABOLIC AND IMMUNITY DISORDER: ICD-10-CM

## 2025-01-14 DIAGNOSIS — E55.9 VITAMIN D DEFICIENCY: ICD-10-CM

## 2025-01-14 DIAGNOSIS — E78.5 HYPERLIPIDEMIA, UNSPECIFIED HYPERLIPIDEMIA TYPE: ICD-10-CM

## 2025-01-16 ENCOUNTER — PATIENT OUTREACH (OUTPATIENT)
Facility: CLINIC | Age: 50
End: 2025-01-16
Payer: COMMERCIAL

## 2025-01-16 ENCOUNTER — TELEPHONE (OUTPATIENT)
Dept: INTERNAL MEDICINE | Facility: CLINIC | Age: 50
End: 2025-01-16
Payer: COMMERCIAL

## 2025-01-16 LAB
CHOLEST SERPL-MSCNC: 165 MG/DL (ref 0–200)
CREATININE, URINE: 124 MG/DL
HDLC SERPL-MCNC: 46 MG/DL (ref 35–70)
LDLC SERPL CALC-MCNC: 98 MG/DL (ref 0–160)
MICROALB/CREAT RATIO: <2
MICROALBUMIN URINE: <0.3
TRIGL SERPL-MCNC: 104 MG/DL (ref 40–160)

## 2025-01-16 NOTE — TELEPHONE ENCOUNTER
----- Message from Cyphort sent at 1/16/2025  8:50 AM CST -----  Regarding: return call  Who Called: Yevgeniy Yin    Patient is returning phone call    Who Left Message for Patient:Elana  Does the patient know what this is regarding?:states he's still at hospital waiting for orders       Preferred Method of Contact: Phone Call  Patient's Preferred Phone Number on File: [phone]   Best Call Back Number, if different:  Additional Information:

## 2025-01-16 NOTE — TELEPHONE ENCOUNTER
Spoke with pt regarding lab orders placed and faxed to [facility], informed pt of labs placed and faxed

## 2025-01-16 NOTE — PROGRESS NOTES
Health Maintenance Topic(s) Outreach Outcomes & Actions Taken:    Lab(s) - Outreach Outcomes & Actions Taken  : External Records Uploaded & Care Team Updated if Applicable       Additional Notes:  Lipid, & Diabetic Urine 1/16/25          Detail Level: Zone Detail Level: Detailed Detail Level: Generalized Detail Level: Simple

## 2025-01-17 ENCOUNTER — TELEPHONE (OUTPATIENT)
Dept: INTERNAL MEDICINE | Facility: CLINIC | Age: 50
End: 2025-01-17
Payer: COMMERCIAL

## 2025-01-20 ENCOUNTER — TELEPHONE (OUTPATIENT)
Dept: INTERNAL MEDICINE | Facility: CLINIC | Age: 50
End: 2025-01-20
Payer: COMMERCIAL

## 2025-01-24 ENCOUNTER — TELEPHONE (OUTPATIENT)
Dept: INTERNAL MEDICINE | Facility: CLINIC | Age: 50
End: 2025-01-24
Payer: COMMERCIAL

## 2025-01-24 NOTE — TELEPHONE ENCOUNTER
----- Message from Capo sent at 1/17/2025 12:08 PM CST -----  .Who Called: Yevgeniy Yin    Patient is returning phone call    Who Left Message for Patient: Elnaa    Does the patient know what this is regarding?: Appointment    Preferred Method of Contact: Phone Call    Patient's Preferred Phone Number on File: [phone]     Best Call Back Number, if different:    Additional Information: Returning missed call. Please advise, thank you.

## 2025-01-28 ENCOUNTER — OFFICE VISIT (OUTPATIENT)
Dept: INTERNAL MEDICINE | Facility: CLINIC | Age: 50
End: 2025-01-28
Payer: COMMERCIAL

## 2025-01-28 VITALS
BODY MASS INDEX: 35.08 KG/M2 | HEART RATE: 80 BPM | SYSTOLIC BLOOD PRESSURE: 128 MMHG | HEIGHT: 72 IN | WEIGHT: 259 LBS | DIASTOLIC BLOOD PRESSURE: 74 MMHG

## 2025-01-28 DIAGNOSIS — E29.1 HYPOGONADISM IN MALE: ICD-10-CM

## 2025-01-28 DIAGNOSIS — E78.5 HYPERLIPIDEMIA, UNSPECIFIED HYPERLIPIDEMIA TYPE: ICD-10-CM

## 2025-01-28 DIAGNOSIS — E66.812 CLASS 2 OBESITY DUE TO EXCESS CALORIES WITHOUT SERIOUS COMORBIDITY WITH BODY MASS INDEX (BMI) OF 35.0 TO 35.9 IN ADULT: Chronic | ICD-10-CM

## 2025-01-28 DIAGNOSIS — E55.9 VITAMIN D DEFICIENCY: ICD-10-CM

## 2025-01-28 DIAGNOSIS — E29.1 HYPOGONADISM IN MALE: Primary | Chronic | ICD-10-CM

## 2025-01-28 DIAGNOSIS — E78.2 MIXED HYPERLIPIDEMIA: Chronic | ICD-10-CM

## 2025-01-28 DIAGNOSIS — Z00.00 WELLNESS EXAMINATION: Primary | ICD-10-CM

## 2025-01-28 DIAGNOSIS — E66.09 CLASS 2 OBESITY DUE TO EXCESS CALORIES WITHOUT SERIOUS COMORBIDITY WITH BODY MASS INDEX (BMI) OF 35.0 TO 35.9 IN ADULT: Chronic | ICD-10-CM

## 2025-01-28 DIAGNOSIS — N13.8 BPH WITH OBSTRUCTION/LOWER URINARY TRACT SYMPTOMS: Chronic | ICD-10-CM

## 2025-01-28 DIAGNOSIS — I87.2 VENOUS INSUFFICIENCY (CHRONIC) (PERIPHERAL): Chronic | ICD-10-CM

## 2025-01-28 DIAGNOSIS — I10 HYPERTENSION, UNSPECIFIED TYPE: ICD-10-CM

## 2025-01-28 DIAGNOSIS — Z12.5 SCREENING FOR MALIGNANT NEOPLASM OF PROSTATE: ICD-10-CM

## 2025-01-28 DIAGNOSIS — M10.9 GOUT, UNSPECIFIED CAUSE, UNSPECIFIED CHRONICITY, UNSPECIFIED SITE: Chronic | ICD-10-CM

## 2025-01-28 DIAGNOSIS — F32.9 MAJOR DEPRESSIVE DISORDER, REMISSION STATUS UNSPECIFIED, UNSPECIFIED WHETHER RECURRENT: Chronic | ICD-10-CM

## 2025-01-28 DIAGNOSIS — M10.9 GOUT, UNSPECIFIED CAUSE, UNSPECIFIED CHRONICITY, UNSPECIFIED SITE: ICD-10-CM

## 2025-01-28 DIAGNOSIS — N40.1 BPH WITH OBSTRUCTION/LOWER URINARY TRACT SYMPTOMS: Chronic | ICD-10-CM

## 2025-01-28 PROBLEM — E66.01 CLASS 2 SEVERE OBESITY DUE TO EXCESS CALORIES WITH SERIOUS COMORBIDITY AND BODY MASS INDEX (BMI) OF 35.0 TO 35.9 IN ADULT: Chronic | Status: ACTIVE | Noted: 2025-01-28

## 2025-01-28 PROBLEM — E66.01 CLASS 2 SEVERE OBESITY DUE TO EXCESS CALORIES WITH SERIOUS COMORBIDITY AND BODY MASS INDEX (BMI) OF 35.0 TO 35.9 IN ADULT: Status: ACTIVE | Noted: 2025-01-28

## 2025-01-28 PROCEDURE — 3074F SYST BP LT 130 MM HG: CPT | Mod: CPTII,,,

## 2025-01-28 PROCEDURE — 1159F MED LIST DOCD IN RCRD: CPT | Mod: CPTII,,,

## 2025-01-28 PROCEDURE — 99214 OFFICE O/P EST MOD 30 MIN: CPT | Mod: ,,,

## 2025-01-28 PROCEDURE — 3008F BODY MASS INDEX DOCD: CPT | Mod: CPTII,,,

## 2025-01-28 PROCEDURE — 3078F DIAST BP <80 MM HG: CPT | Mod: CPTII,,,

## 2025-01-28 PROCEDURE — 1160F RVW MEDS BY RX/DR IN RCRD: CPT | Mod: CPTII,,,

## 2025-01-28 RX ORDER — ROSUVASTATIN CALCIUM 20 MG/1
20 TABLET, COATED ORAL NIGHTLY
COMMUNITY
Start: 2025-01-25

## 2025-01-28 NOTE — ASSESSMENT & PLAN NOTE
-Stable  -Currently on sertraline 100 mg daily, continue  -Do not abruptly stop medication  -Notify clinic for new or worsening symptoms

## 2025-01-28 NOTE — ASSESSMENT & PLAN NOTE
Estimated body mass index is 35.13 kg/m² as calculated from the following:    Height as of this encounter: 6' (1.829 m).    Weight as of this encounter: 117.5 kg (259 lb).   -encourage low-calorie low carb diet   -encourage some form of routine aerobic exercise

## 2025-01-28 NOTE — ASSESSMENT & PLAN NOTE
Lab Results   Component Value Date    CHOL 165 01/16/2025    HDL 46 01/16/2025    TRIG 104 01/16/2025     -Stable   -Currently on rosuvastatin 20 mg daily, continue  -encourage low-cholesterol diet   -okay to utilize OTC red yeast rice extract, flaxseed, and/or Omega fish oil supplement  -encourage routine aerobic exercise

## 2025-01-28 NOTE — PROGRESS NOTES
Patient ID: Yevgeniy Yin is a 49 y.o. male.    Chief Complaint: Annual Exam (6 month check up -No complaints or concerns today.)    Yevgeniy Yin is a 49 y.o. male, known to Dr Chaney, presents today for a six-month follow-up visit.  Medical comorbidities include HLD, MDD, gout, BPH with LUTS, and male hypogonadism.      History of Present Illness    Patient presents today for follow up. He is currently on testosterone therapy with recent dose increase from 200 mg to 300 mg (1.5 mL).  Reports improvement in fatigue like symptoms with current dosing.  Unfortunately was unable to complete testosterone level or PSA on external labs.  CBC noted stable.  He takes Finasteride and Flomax for BPH with LUTS managed by urology.  Also reports followed by cardiology for ankle swelling.  External labs did note Vitamin D level is 32.3, encouraged OTC vitamin-D supplementation.  Otherwise generally stable for today's visit without acute concerns      Wellness:  07/01/2024         MEDICAL HISTORY:    Past Medical History:   Diagnosis Date    BPH with obstruction/lower urinary tract symptoms 01/28/2025    Gout, unspecified     Hypogonadism in male     Major depressive disorder 01/28/2025    Mixed hyperlipidemia 01/28/2025    Sleep apnea, unspecified     Venous insufficiency (chronic) (peripheral) 01/28/2025      Past Surgical History:   Procedure Laterality Date    APPENDECTOMY      Colon AEC   07/06/2023    Trever Alexis/Repeat colonoscopy in 10 years    FACIAL RECONSTRUCTION SURGERY N/A     KNEE SURGERY N/A     x2      Social History     Tobacco Use    Smoking status: Never     Passive exposure: Never    Smokeless tobacco: Never   Substance Use Topics    Alcohol use: Yes     Alcohol/week: 1.0 standard drink of alcohol     Types: 1 Cans of beer per week          Health Maintenance Due   Topic Date Due    Hepatitis C Screening  Never done    HIV Screening  Never done    Hemoglobin A1c (Diabetic Prevention  Screening)  Never done    Shingles Vaccine (1 of 2) Never done    COVID-19 Vaccine (3 - 2024-25 season) 09/01/2024          Patient Care Team:  Levi Chaney MD as PCP - General (Internal Medicine)  Zainab Brink LPN as Care Coordinator  Trever Alexis MD as Consulting Physician (Gastroenterology)  Lawson Rm MD as Consulting Physician (Urology)  Erlin Suarez Jr., MD as Consulting Physician (Cardiology)  Timothy Padron OD as Consulting Provider (Optometry)      Review of Systems   Constitutional:  Negative for fatigue and fever.   HENT:  Negative for congestion, rhinorrhea, sore throat and trouble swallowing.    Eyes:  Negative for redness and visual disturbance.   Respiratory:  Negative for cough, chest tightness and shortness of breath.    Cardiovascular:  Negative for chest pain and palpitations.   Gastrointestinal:  Negative for abdominal pain, constipation, diarrhea, nausea and vomiting.   Genitourinary:  Negative for dysuria, flank pain, frequency and urgency.   Musculoskeletal:  Negative for arthralgias, gait problem and myalgias.   Skin:  Negative for rash and wound.   Neurological:  Negative for facial asymmetry, speech difficulty, weakness and headaches.   All other systems reviewed and are negative.      Objective:   /74 (BP Location: Left arm, Patient Position: Sitting)   Pulse 80   Ht 6' (1.829 m)   Wt 117.5 kg (259 lb)   BMI 35.13 kg/m²      Physical Exam  Constitutional:       General: He is not in acute distress.     Appearance: Normal appearance. He is obese.   HENT:      Right Ear: Tympanic membrane, ear canal and external ear normal.      Left Ear: Tympanic membrane, ear canal and external ear normal.      Nose: Nose normal.      Mouth/Throat:      Mouth: Mucous membranes are moist.      Pharynx: Oropharynx is clear.   Eyes:      Extraocular Movements: Extraocular movements intact.      Conjunctiva/sclera: Conjunctivae normal.      Pupils: Pupils  are equal, round, and reactive to light.   Cardiovascular:      Rate and Rhythm: Normal rate and regular rhythm.      Pulses: Normal pulses.      Heart sounds: Normal heart sounds. No murmur heard.     No gallop.   Pulmonary:      Effort: Pulmonary effort is normal.      Breath sounds: Normal breath sounds. No wheezing.   Abdominal:      General: Bowel sounds are normal. There is no distension.      Palpations: Abdomen is soft. There is no mass.      Tenderness: There is no abdominal tenderness. There is no guarding.   Musculoskeletal:         General: Normal range of motion.   Skin:     General: Skin is warm and dry.   Neurological:      Mental Status: He is alert. Mental status is at baseline.      Sensory: No sensory deficit.      Motor: No weakness.           Assessment:       ICD-10-CM ICD-9-CM   1. Hypogonadism in male  E29.1 257.2   2. BPH with obstruction/lower urinary tract symptoms  N40.1 600.01    N13.8 599.69   3. Mixed hyperlipidemia  E78.2 272.2   4. Venous insufficiency (chronic) (peripheral)  I87.2 459.81   5. Class 2 obesity due to excess calories without serious comorbidity with body mass index (BMI) of 35.0 to 35.9 in adult  E66.812 278.00    E66.09 V85.35    Z68.35    6. Gout, unspecified cause, unspecified chronicity, unspecified site  M10.9 274.9   7. Major depressive disorder, remission status unspecified, unspecified whether recurrent  F32.9 296.20        Plan:     Problem List Items Addressed This Visit          Psychiatric    Major depressive disorder (Chronic)     -Stable  -Currently on sertraline 100 mg daily, continue  -Do not abruptly stop medication  -Notify clinic for new or worsening symptoms              Cardiac/Vascular    Venous insufficiency (chronic) (peripheral) (Chronic)     -stable and established with Cardiology   -low-sodium diet encouraged   -elevate extremity         Mixed hyperlipidemia (Chronic)     Lab Results   Component Value Date    CHOL 165 01/16/2025    HDL 46  "01/16/2025    TRIG 104 01/16/2025     -Stable   -Currently on rosuvastatin 20 mg daily, continue  -encourage low-cholesterol diet   -okay to utilize OTC red yeast rice extract, flaxseed, and/or Omega fish oil supplement  -encourage routine aerobic exercise              Renal/    BPH with obstruction/lower urinary tract symptoms (Chronic)     -stable and established with Urology   -currently on tamsulosin 0.4 mg b.i.d., finasteride 5 mg q.d., continue            Endocrine    Hypogonadism in male - Primary (Chronic)     -unfortunately testosterone PSA level were not completed at external lab   -order updated testosterone and PSA level  -is currently established with Urology for BPH as well  -CBC is stable         Class 2 severe obesity due to excess calories with serious comorbidity and body mass index (BMI) of 35.0 to 35.9 in adult (Chronic)     Estimated body mass index is 35.13 kg/m² as calculated from the following:    Height as of this encounter: 6' (1.829 m).    Weight as of this encounter: 117.5 kg (259 lb).   -encourage low-calorie low carb diet   -encourage some form of routine aerobic exercise              Orthopedic    Gout, unspecified (Chronic)     -stable   -currently on allopurinol 100 mg daily, continue  -encourage low purine diet             Follow up in about 6 months (around 7/28/2025) for Wellness with labs prior to visit, with Dr. Chaney.   -plan specifics discussed above          Medication List with Changes/Refills   Current Medications    ALLOPURINOL (ZYLOPRIM) 100 MG TABLET    Take 1 tablet (100 mg total) by mouth once daily.    BD LUER-TENISHA SYRINGE 3 ML 22 GAUGE X 1" SYRG    USE AS DIRECTED TO INJECT TESTOSTERONE.    BD LUER-TENISHA SYRINGE 3 ML 22 X 1 1/2" SYRG    SMARTSIG:Injection As Directed    BD REGULAR BEVEL NEEDLES 18 GAUGE X 1" NDLE    USE AS DIRECTED TO INJECT TESTOSTERONE.    FINASTERIDE (PROSCAR) 5 MG TABLET    Take 1 tablet by mouth once daily.    ROSUVASTATIN (CRESTOR) 20 MG " TABLET    Take 20 mg by mouth every evening.    SERTRALINE (ZOLOFT) 100 MG TABLET    Take 100 mg by mouth.    TAMSULOSIN (FLOMAX) 0.4 MG CAP    Take 1 capsule (0.4 mg total) by mouth 2 (two) times a day.    TESTOSTERONE CYPIONATE (DEPOTESTOTERONE CYPIONATE) 200 MG/ML INJECTION    INJECT 1 ML INTRAMUSCULARLY ONCE EVERY 2 WEEKS    TESTOSTERONE CYPIONATE (DEPOTESTOTERONE CYPIONATE) 200 MG/ML INJECTION    Inject 1.5 mLs (300 mg total) into the muscle every 14 (fourteen) days.      This note was generated with the assistance of ambient listening technology. Verbal consent was obtained by the patient and accompanying visitor(s) for the recording of patient appointment to facilitate this note. I attest to having reviewed and edited the generated note for accuracy, though some syntax or spelling errors may persist. Please contact the author of this note for any clarification.

## 2025-01-28 NOTE — ASSESSMENT & PLAN NOTE
-unfortunately testosterone PSA level were not completed at external lab   -order updated testosterone and PSA level  -is currently established with Urology for BPH as well  -CBC is stable

## 2025-01-28 NOTE — ASSESSMENT & PLAN NOTE
-stable and established with Urology   -currently on tamsulosin 0.4 mg b.i.d., finasteride 5 mg q.d., continue

## 2025-02-12 ENCOUNTER — TELEPHONE (OUTPATIENT)
Dept: INTERNAL MEDICINE | Facility: CLINIC | Age: 50
End: 2025-02-12
Payer: COMMERCIAL

## 2025-02-12 DIAGNOSIS — M10.9 GOUT, UNSPECIFIED CAUSE, UNSPECIFIED CHRONICITY, UNSPECIFIED SITE: Chronic | ICD-10-CM

## 2025-02-12 RX ORDER — ALLOPURINOL 100 MG/1
100 TABLET ORAL DAILY
Qty: 30 TABLET | Refills: 3 | Status: SHIPPED | OUTPATIENT
Start: 2025-02-12

## 2025-02-12 NOTE — TELEPHONE ENCOUNTER
----- Message from Babrie sent at 2025  8:21 AM CST -----  Regarding: Medication Refill  .MEDICATION REFILL REQUEST      PATIENT PHONE #:     :     PHARMACY:     PHARMACY PHONE #: [facility]      ALLERGIES:     MESSAGE Pt called and stated he needs a refill on his Allopurinol sent to the pharmacy, pt states he needs refills as well ..please advise

## 2025-03-24 DIAGNOSIS — E29.1 HYPOGONADISM IN MALE: Chronic | ICD-10-CM

## 2025-03-24 RX ORDER — TESTOSTERONE CYPIONATE 200 MG/ML
INJECTION, SOLUTION INTRAMUSCULAR
Qty: 10 ML | Refills: 5 | OUTPATIENT
Start: 2025-03-24

## 2025-03-24 NOTE — TELEPHONE ENCOUNTER
Labs needed. Advised patient it is refused until we get lab results. He voiced understanding and thanks. Labs faxed to [facility] and mailed to him

## 2025-04-09 ENCOUNTER — TELEPHONE (OUTPATIENT)
Dept: INTERNAL MEDICINE | Facility: CLINIC | Age: 50
End: 2025-04-09
Payer: COMMERCIAL

## 2025-04-09 NOTE — TELEPHONE ENCOUNTER
Spoke with pt he was advise that we haven't received his results yet, and once we do we will give him a call

## 2025-04-09 NOTE — TELEPHONE ENCOUNTER
----- Message from Tressa sent at 4/9/2025  9:01 AM CDT -----  Who Called: Yevgeniy Jones is requesting information on test results.Name of Test (Lab/Mammo/Etc): testosterone Date of Test: last weekWhere the test was performed: [place] generalOrdering Provider: Levi Chaney MDPreferred Method of Contact: Phone CallPatient's Preferred Phone Number on File: [phone] Best Call Back Number, if different:Additional Information:

## 2025-04-11 DIAGNOSIS — E29.1 HYPOGONADISM IN MALE: Chronic | ICD-10-CM

## 2025-04-11 RX ORDER — TESTOSTERONE CYPIONATE 200 MG/ML
300 INJECTION, SOLUTION INTRAMUSCULAR
Qty: 10 ML | Refills: 5 | Status: SHIPPED | OUTPATIENT
Start: 2025-04-11 | End: 2026-10-23

## 2025-05-22 DIAGNOSIS — M10.9 GOUT, UNSPECIFIED CAUSE, UNSPECIFIED CHRONICITY, UNSPECIFIED SITE: Chronic | ICD-10-CM

## 2025-05-22 RX ORDER — ALLOPURINOL 100 MG/1
100 TABLET ORAL
Qty: 30 TABLET | Refills: 3 | Status: SHIPPED | OUTPATIENT
Start: 2025-05-22

## 2025-08-25 ENCOUNTER — TELEPHONE (OUTPATIENT)
Dept: INTERNAL MEDICINE | Facility: CLINIC | Age: 50
End: 2025-08-25
Payer: COMMERCIAL

## 2025-08-27 ENCOUNTER — PATIENT OUTREACH (OUTPATIENT)
Facility: CLINIC | Age: 50
End: 2025-08-27
Payer: COMMERCIAL

## 2025-08-27 LAB
CHOL/HDLC RATIO: NORMAL
CHOLEST SERPL-MSCNC: 120 MG/DL (ref 0–200)
CHOLEST/HDLC SERPL: NORMAL {RATIO}
HBA1C MFR BLD: 5.3 % (ref 4–6)
HDL/CHOLESTEROL RATIO: 2.9 %
HDLC SERPL-MCNC: 41 MG/DL (ref 35–70)
LDL CHOLESTEROL DIRECT: NORMAL
LDLC SERPL CALC-MCNC: 63 MG/DL
LDLC SERPL CALC-MCNC: NORMAL MG/DL
NONHDLC SERPL-MCNC: NORMAL MG/DL
TRIGL SERPL-MCNC: 83 MG/DL (ref 40–160)
VLDL CHOLESTEROL: 17 MG/DL

## 2025-08-29 ENCOUNTER — OFFICE VISIT (OUTPATIENT)
Dept: INTERNAL MEDICINE | Facility: CLINIC | Age: 50
End: 2025-08-29
Payer: COMMERCIAL

## 2025-08-29 VITALS
OXYGEN SATURATION: 97 % | DIASTOLIC BLOOD PRESSURE: 80 MMHG | BODY MASS INDEX: 35.21 KG/M2 | WEIGHT: 260 LBS | SYSTOLIC BLOOD PRESSURE: 120 MMHG | HEIGHT: 72 IN | HEART RATE: 72 BPM

## 2025-08-29 DIAGNOSIS — E78.2 MIXED HYPERLIPIDEMIA: Chronic | ICD-10-CM

## 2025-08-29 DIAGNOSIS — E29.1 HYPOGONADISM IN MALE: Chronic | ICD-10-CM

## 2025-08-29 DIAGNOSIS — E66.812 CLASS 2 SEVERE OBESITY DUE TO EXCESS CALORIES WITH SERIOUS COMORBIDITY AND BODY MASS INDEX (BMI) OF 35.0 TO 35.9 IN ADULT: Chronic | ICD-10-CM

## 2025-08-29 DIAGNOSIS — D75.1 POLYCYTHEMIA: ICD-10-CM

## 2025-08-29 DIAGNOSIS — M10.029 IDIOPATHIC GOUT OF ELBOW, UNSPECIFIED CHRONICITY, UNSPECIFIED LATERALITY: Chronic | ICD-10-CM

## 2025-08-29 DIAGNOSIS — Z00.00 WELLNESS EXAMINATION: Primary | ICD-10-CM

## 2025-08-29 DIAGNOSIS — E66.01 CLASS 2 SEVERE OBESITY DUE TO EXCESS CALORIES WITH SERIOUS COMORBIDITY AND BODY MASS INDEX (BMI) OF 35.0 TO 35.9 IN ADULT: Chronic | ICD-10-CM
